# Patient Record
Sex: MALE | Race: ASIAN | NOT HISPANIC OR LATINO
[De-identification: names, ages, dates, MRNs, and addresses within clinical notes are randomized per-mention and may not be internally consistent; named-entity substitution may affect disease eponyms.]

---

## 2023-06-16 PROBLEM — Z00.00 ENCOUNTER FOR PREVENTIVE HEALTH EXAMINATION: Status: ACTIVE | Noted: 2023-06-16

## 2023-06-26 ENCOUNTER — APPOINTMENT (OUTPATIENT)
Dept: ORTHOPEDIC SURGERY | Facility: CLINIC | Age: 48
End: 2023-06-26
Payer: MEDICARE

## 2023-06-26 PROCEDURE — 99203 OFFICE O/P NEW LOW 30 MIN: CPT

## 2023-06-26 RX ORDER — SPIRONOLACTONE 25 MG/1
25 TABLET ORAL
Qty: 90 | Refills: 0 | Status: ACTIVE | COMMUNITY
Start: 2023-06-24

## 2023-06-26 RX ORDER — DAPAGLIFLOZIN 10 MG/1
10 TABLET, FILM COATED ORAL
Qty: 90 | Refills: 0 | Status: ACTIVE | COMMUNITY
Start: 2023-03-29

## 2023-06-26 RX ORDER — PANTOPRAZOLE 40 MG/1
40 TABLET, DELAYED RELEASE ORAL
Qty: 90 | Refills: 0 | Status: ACTIVE | COMMUNITY
Start: 2023-04-04

## 2023-06-26 RX ORDER — BUDESONIDE AND FORMOTEROL FUMARATE DIHYDRATE 160; 4.5 UG/1; UG/1
160-4.5 AEROSOL RESPIRATORY (INHALATION)
Qty: 31 | Refills: 0 | Status: ACTIVE | COMMUNITY
Start: 2023-05-31

## 2023-06-26 RX ORDER — SACUBITRIL AND VALSARTAN 97; 103 MG/1; MG/1
97-103 TABLET, FILM COATED ORAL
Qty: 60 | Refills: 0 | Status: ACTIVE | COMMUNITY
Start: 2023-06-23

## 2023-06-26 RX ORDER — ALBUTEROL SULFATE 90 UG/1
108 (90 BASE) INHALANT RESPIRATORY (INHALATION)
Qty: 8 | Refills: 0 | Status: ACTIVE | COMMUNITY
Start: 2023-05-31

## 2023-06-26 NOTE — IMAGING
[Right] : right shoulder [Outside films reviewed] : Outside films reviewed [There are no fractures, subluxations or dislocations. No significant abnormalities are seen] : There are no fractures, subluxations or dislocations. No significant abnormalities are seen [Type 2 acromion] : Type 2 acromion [AC Joint Arthrosis] : AC Joint Arthrosis [de-identified] : (Physical Exam: Shoulder)\par \par Laterality is right \par \par Patient is in no acute distress, alert and oriented\par Sensation is grossly intact to light touch in the hand\par Motor function is 5/5 in the hand\par Capillary refill is less than 2 seconds in the fingers\par Lymphadenopathy is not present\par Peripheral edema is not present\par \par Skin is intact \par Swelling is not present \par Atrophy is not present\par Scapular winging is not present\par Deformity of the AC joint is not present\par Deformity of the biceps is not present \par \par Bicipital groove tenderness is not present \par AC joint tenderness is not present \par Scapulothoracic tenderness is present \par Cervical paraspinal tenderness is not present \par \par Active forward elevation is 175\par Passive forward elevation is 175\par External rotation at the side is 80\par Internal rotation behind the back is to the level of T7 \par \par Forward elevation strength is 5/5\par External rotation strength at the side is 5/5 \par Internal rotation strength at the side is 5/5 \par Deltoid strength of anterior, posterior and lateral heads is 5/5\par \par Dixon test is abnormal \par O’Leroy’s test is normal\par Speed’s test is normal\par Cross body adduction test is normal\par Apprehension and relocation is normal\par Sulcus sign is normal\par Belly press test is normal\par Spurling’s test is normal\par

## 2023-06-26 NOTE — DISCUSSION/SUMMARY
[de-identified] : (Assessment and Plan)\par \par History, clinical examination and imaging were most consistent with:\par -right shoulder rotator cuff tendonitis\par \par The diagnosis was explained in detail. The potential non-surgical and surgical treatments were reviewed. The relative risks and benefits of each option were considered relative to the patient’s age, activity level, medical history, symptom severity and previously attempted treatments. \par \par The patient was advised to consult with their primary medical provider prior to initiation of any new medications to reduce the risk of adverse effects specific to their long-term home medications and medical history. The risk of gastrointestinal irritation and kidney injury specific to long-term NSAID use was discussed.   \par \par \par -Patient will proceed with formal PT.\par -Over the counter acetaminophen as needed for pain. \par -The added clinical utility of an MRI was discussed. The patient deferred further diagnostic testing at this time.\par -Follow up in 6 weeks. Consider MRI at that time.\par \par (MDM) \par \par Problem Complexity\par -Moderate: chronic illness with exacerbation\par \par Risk\par -Low: over the counter medication\par \par -Patient has not been seen by another provider in my practice within the past 2 years who specializes in orthopedic sports medicine, shoulder or elbow surgery. \par \par

## 2023-06-26 NOTE — HISTORY OF PRESENT ILLNESS
[de-identified] : (History of Present Illness)\par Patient age in years is 48 \par Occupation is not currently working\par \par Body part causing symptoms is the RT shoulder \par Symptoms began about a few weeks ago. NKI, woke up with pain. \par Location of pain is anterior lateral\par Quality of pain is  sharp\par Pain score at rest is  3/10\par Pain score during activity is  10/10\par Radicular symptoms are not present \par Prior treatments include none\par Patient's condition is not associated with worker’s compensation, no-fault or interscholastic athletics \par   \par IMPRESSION:\par Mild acromioclavicular degenerative changes M19.011\par \par Patient takes multiple medications due to CHF and tourette's\par \par \par

## 2023-08-28 ENCOUNTER — APPOINTMENT (OUTPATIENT)
Dept: ORTHOPEDIC SURGERY | Facility: CLINIC | Age: 48
End: 2023-08-28
Payer: MEDICARE

## 2023-08-28 PROCEDURE — 99213 OFFICE O/P EST LOW 20 MIN: CPT

## 2023-08-28 NOTE — IMAGING
[Right] : right shoulder [Outside films reviewed] : Outside films reviewed [There are no fractures, subluxations or dislocations. No significant abnormalities are seen] : There are no fractures, subluxations or dislocations. No significant abnormalities are seen [Type 2 acromion] : Type 2 acromion [AC Joint Arthrosis] : AC Joint Arthrosis [de-identified] : (Physical Exam: Shoulder)  Laterality is right   Patient is in no acute distress, alert and oriented Sensation is grossly intact to light touch in the hand Motor function is 5/5 in the hand Capillary refill is less than 2 seconds in the fingers Lymphadenopathy is not present Peripheral edema is not present  Skin is intact  Swelling is not present  Atrophy is not present Scapular winging is not present Deformity of the AC joint is not present Deformity of the biceps is not present   Bicipital groove tenderness is not present  AC joint tenderness is not present  Scapulothoracic tenderness is present  Cervical paraspinal tenderness is not present   Active forward elevation is 175 Passive forward elevation is 175 External rotation at the side is 80 Internal rotation behind the back is to the level of T7   Forward elevation strength is 5/5 External rotation strength at the side is 5/5  Internal rotation strength at the side is 5/5  Deltoid strength of anterior, posterior and lateral heads is 5/5  Dixon test is abnormal  Reeves's test is normal Speed's test is normal Cross body adduction test is normal Apprehension and relocation is normal Sulcus sign is normal Belly press test is normal Spurling's test is normal

## 2023-08-28 NOTE — DISCUSSION/SUMMARY
[de-identified] : (Assessment and Plan)  History, clinical examination and imaging were most consistent with: -right shoulder rotator cuff partial tear  The diagnosis was explained in detail. The potential non-surgical and surgical treatments were reviewed. The relative risks and benefits of each option were considered relative to the patient's age, activity level, medical history, symptom severity and previously attempted treatments.   The patient was advised to consult with their primary medical provider prior to initiation of any new medications to reduce the risk of adverse effects specific to their long-term home medications and medical history. The risk of gastrointestinal irritation and kidney injury specific to long-term NSAID use was discussed.      -Patient will continue with formal PT and transition to HEP.  -Over the counter acetaminophen as needed for pain.  -MRI is deferred as symptoms are improving -Follow up as needed, if symptoms recur would consider MRI and CSI.   (MDM)   Problem Complexity -Moderate: chronic illness with exacerbation

## 2023-08-28 NOTE — HISTORY OF PRESENT ILLNESS
[de-identified] : 08/28/2023 RT shoulder f/u. Shoulder feeling much better. Going to PT 2x a week.   Patient age in years is 48  Occupation is not currently working Body part causing symptoms is the RT shoulder  Symptoms began about a few weeks ago. NKI, woke up with pain.  Location of pain is anterior lateral Quality of pain is  sharp Pain score at rest is  3/10 Pain score during activity is  10/10 Radicular symptoms are not present  Prior treatments include none Patient's condition is not associated with worker's compensation, no-fault or interscholastic athletics  Patient takes multiple medications due to CHF and tourette's

## 2023-10-06 ENCOUNTER — APPOINTMENT (OUTPATIENT)
Dept: PULMONOLOGY | Facility: CLINIC | Age: 48
End: 2023-10-06
Payer: MEDICARE

## 2023-10-06 VITALS
WEIGHT: 217 LBS | OXYGEN SATURATION: 96 % | TEMPERATURE: 97.5 F | HEIGHT: 63 IN | BODY MASS INDEX: 38.45 KG/M2 | HEART RATE: 70 BPM | DIASTOLIC BLOOD PRESSURE: 62 MMHG | SYSTOLIC BLOOD PRESSURE: 100 MMHG

## 2023-10-06 DIAGNOSIS — Z87.898 PERSONAL HISTORY OF OTHER SPECIFIED CONDITIONS: ICD-10-CM

## 2023-10-06 DIAGNOSIS — Z86.59 PERSONAL HISTORY OF OTHER MENTAL AND BEHAVIORAL DISORDERS: ICD-10-CM

## 2023-10-06 DIAGNOSIS — Z87.09 PERSONAL HISTORY OF OTHER DISEASES OF THE RESPIRATORY SYSTEM: ICD-10-CM

## 2023-10-06 DIAGNOSIS — K21.00 GASTRO-ESOPHAGEAL REFLUX DISEASE WITH ESOPHAGITIS, WITHOUT BLEEDING: ICD-10-CM

## 2023-10-06 DIAGNOSIS — Z86.79 PERSONAL HISTORY OF OTHER DISEASES OF THE CIRCULATORY SYSTEM: ICD-10-CM

## 2023-10-06 PROCEDURE — 99204 OFFICE O/P NEW MOD 45 MIN: CPT | Mod: 25

## 2023-10-06 PROCEDURE — 94727 GAS DIL/WSHOT DETER LNG VOL: CPT

## 2023-10-06 PROCEDURE — 94729 DIFFUSING CAPACITY: CPT

## 2023-10-06 PROCEDURE — 94010 BREATHING CAPACITY TEST: CPT

## 2023-10-06 RX ORDER — SACUBITRIL AND VALSARTAN 49; 51 MG/1; MG/1
49-51 TABLET, FILM COATED ORAL
Qty: 60 | Refills: 0 | Status: DISCONTINUED | COMMUNITY
Start: 2022-11-23 | End: 2023-10-06

## 2023-10-06 RX ORDER — CLONAZEPAM 0.5 MG/1
0.5 TABLET ORAL
Qty: 60 | Refills: 0 | Status: DISCONTINUED | COMMUNITY
Start: 2023-05-22 | End: 2023-10-06

## 2023-10-06 RX ORDER — TRIAMCINOLONE ACETONIDE 1 MG/G
0.1 OINTMENT TOPICAL
Qty: 454 | Refills: 0 | Status: DISCONTINUED | COMMUNITY
Start: 2023-03-28 | End: 2023-10-06

## 2023-10-06 RX ORDER — CARVEDILOL 6.25 MG/1
6.25 TABLET, FILM COATED ORAL
Qty: 60 | Refills: 0 | Status: DISCONTINUED | COMMUNITY
Start: 2023-02-13 | End: 2023-10-06

## 2023-10-06 RX ORDER — CYCLOBENZAPRINE HYDROCHLORIDE 10 MG/1
10 TABLET, FILM COATED ORAL
Qty: 21 | Refills: 0 | Status: DISCONTINUED | COMMUNITY
Start: 2023-06-13 | End: 2023-10-06

## 2023-10-06 RX ORDER — COLCHICINE 0.6 MG/1
0.6 TABLET ORAL
Qty: 6 | Refills: 0 | Status: DISCONTINUED | COMMUNITY
Start: 2023-03-01 | End: 2023-10-06

## 2023-10-06 RX ORDER — FUROSEMIDE 20 MG/1
20 TABLET ORAL
Qty: 30 | Refills: 0 | Status: DISCONTINUED | COMMUNITY
Start: 2023-03-13 | End: 2023-10-06

## 2023-10-06 RX ORDER — ROPINIROLE 0.5 MG/1
0.5 TABLET, FILM COATED ORAL
Qty: 90 | Refills: 0 | Status: DISCONTINUED | COMMUNITY
Start: 2023-06-16 | End: 2023-10-06

## 2023-10-06 RX ORDER — HYDROCORTISONE 1 %
12 CREAM (GRAM) TOPICAL
Qty: 400 | Refills: 0 | Status: DISCONTINUED | COMMUNITY
Start: 2023-02-09 | End: 2023-10-06

## 2023-10-12 ENCOUNTER — NON-APPOINTMENT (OUTPATIENT)
Age: 48
End: 2023-10-12

## 2023-10-31 ENCOUNTER — APPOINTMENT (OUTPATIENT)
Dept: NEUROLOGY | Facility: CLINIC | Age: 48
End: 2023-10-31
Payer: MEDICARE

## 2023-10-31 VITALS — WEIGHT: 210 LBS | HEIGHT: 63 IN | BODY MASS INDEX: 37.21 KG/M2

## 2023-10-31 VITALS — SYSTOLIC BLOOD PRESSURE: 102 MMHG | OXYGEN SATURATION: 97 % | HEART RATE: 65 BPM | DIASTOLIC BLOOD PRESSURE: 60 MMHG

## 2023-10-31 DIAGNOSIS — Z02.82 ENCOUNTER FOR ADOPTION SERVICES: ICD-10-CM

## 2023-10-31 DIAGNOSIS — Z86.69 PERSONAL HISTORY OF OTHER DISEASES OF THE NERVOUS SYSTEM AND SENSE ORGANS: ICD-10-CM

## 2023-10-31 DIAGNOSIS — Z86.79 PERSONAL HISTORY OF OTHER DISEASES OF THE CIRCULATORY SYSTEM: ICD-10-CM

## 2023-10-31 DIAGNOSIS — Z78.9 OTHER SPECIFIED HEALTH STATUS: ICD-10-CM

## 2023-10-31 DIAGNOSIS — Z87.898 PERSONAL HISTORY OF OTHER SPECIFIED CONDITIONS: ICD-10-CM

## 2023-10-31 DIAGNOSIS — K25.4 CHRONIC OR UNSPECIFIED GASTRIC ULCER WITH HEMORRHAGE: ICD-10-CM

## 2023-10-31 DIAGNOSIS — Z87.09 PERSONAL HISTORY OF OTHER DISEASES OF THE RESPIRATORY SYSTEM: ICD-10-CM

## 2023-10-31 DIAGNOSIS — I82.401 ACUTE EMBOLISM AND THROMBOSIS OF UNSPECIFIED DEEP VEINS OF RIGHT LOWER EXTREMITY: ICD-10-CM

## 2023-10-31 PROCEDURE — 99203 OFFICE O/P NEW LOW 30 MIN: CPT

## 2023-11-01 PROBLEM — Z87.09 HISTORY OF ASTHMA: Status: RESOLVED | Noted: 2023-11-01 | Resolved: 2023-11-01

## 2023-11-01 PROBLEM — Z86.69 HISTORY OF OBSTRUCTIVE SLEEP APNEA: Status: RESOLVED | Noted: 2023-11-01 | Resolved: 2023-11-01

## 2023-11-01 PROBLEM — Z87.898 HISTORY OF INSOMNIA: Status: RESOLVED | Noted: 2023-11-01 | Resolved: 2023-11-01

## 2023-11-17 ENCOUNTER — APPOINTMENT (OUTPATIENT)
Dept: PULMONOLOGY | Facility: CLINIC | Age: 48
End: 2023-11-17
Payer: MEDICARE

## 2023-11-17 VITALS
HEIGHT: 63 IN | WEIGHT: 210 LBS | OXYGEN SATURATION: 95 % | DIASTOLIC BLOOD PRESSURE: 70 MMHG | SYSTOLIC BLOOD PRESSURE: 100 MMHG | TEMPERATURE: 97.3 F | BODY MASS INDEX: 37.21 KG/M2 | HEART RATE: 86 BPM

## 2023-11-17 PROCEDURE — 99214 OFFICE O/P EST MOD 30 MIN: CPT

## 2023-11-17 RX ORDER — BUDESONIDE AND FORMOTEROL FUMARATE DIHYDRATE 80; 4.5 UG/1; UG/1
80-4.5 AEROSOL RESPIRATORY (INHALATION) TWICE DAILY
Qty: 3 | Refills: 6 | Status: ACTIVE | COMMUNITY
Start: 2023-11-17 | End: 1900-01-01

## 2023-11-28 ENCOUNTER — APPOINTMENT (OUTPATIENT)
Dept: NEUROLOGY | Facility: CLINIC | Age: 48
End: 2023-11-28
Payer: MEDICARE

## 2023-11-28 VITALS
HEART RATE: 82 BPM | HEIGHT: 63 IN | SYSTOLIC BLOOD PRESSURE: 100 MMHG | DIASTOLIC BLOOD PRESSURE: 60 MMHG | BODY MASS INDEX: 37.21 KG/M2 | OXYGEN SATURATION: 98 % | WEIGHT: 210 LBS

## 2023-11-28 PROCEDURE — 99215 OFFICE O/P EST HI 40 MIN: CPT

## 2023-11-28 RX ORDER — FLUPHENAZINE HYDROCHLORIDE 1 MG/1
1 TABLET, FILM COATED ORAL
Qty: 60 | Refills: 0 | Status: ACTIVE | COMMUNITY
Start: 2023-11-28 | End: 1900-01-01

## 2023-12-10 ENCOUNTER — NON-APPOINTMENT (OUTPATIENT)
Age: 48
End: 2023-12-10

## 2023-12-21 ENCOUNTER — APPOINTMENT (OUTPATIENT)
Dept: NEUROLOGY | Facility: CLINIC | Age: 48
End: 2023-12-21
Payer: MEDICARE

## 2023-12-21 VITALS
SYSTOLIC BLOOD PRESSURE: 102 MMHG | HEIGHT: 63 IN | WEIGHT: 210 LBS | BODY MASS INDEX: 37.21 KG/M2 | DIASTOLIC BLOOD PRESSURE: 70 MMHG | OXYGEN SATURATION: 95 % | HEART RATE: 73 BPM

## 2023-12-21 DIAGNOSIS — F32.A ANXIETY DISORDER, UNSPECIFIED: ICD-10-CM

## 2023-12-21 DIAGNOSIS — F41.9 ANXIETY DISORDER, UNSPECIFIED: ICD-10-CM

## 2023-12-21 PROCEDURE — 99215 OFFICE O/P EST HI 40 MIN: CPT

## 2023-12-21 RX ORDER — ESCITALOPRAM OXALATE 10 MG/1
10 TABLET ORAL DAILY
Qty: 60 | Refills: 2 | Status: ACTIVE | COMMUNITY
Start: 2023-11-28 | End: 1900-01-01

## 2023-12-21 RX ORDER — ROPINIROLE 3 MG/1
TABLET, FILM COATED ORAL
Refills: 0 | Status: COMPLETED | COMMUNITY
End: 2023-12-21

## 2023-12-21 RX ORDER — METOPROLOL SUCCINATE 100 MG/1
100 TABLET, EXTENDED RELEASE ORAL
Refills: 0 | Status: ACTIVE | COMMUNITY

## 2023-12-21 RX ORDER — TRIHEXYPHENIDYL HYDROCHLORIDE 2 MG/1
2 TABLET ORAL
Qty: 60 | Refills: 3 | Status: ACTIVE | COMMUNITY
Start: 2023-11-15 | End: 1900-01-01

## 2023-12-21 RX ORDER — CARVEDILOL 12.5 MG/1
12.5 TABLET, FILM COATED ORAL
Qty: 180 | Refills: 0 | Status: DISCONTINUED | COMMUNITY
Start: 2023-03-29 | End: 2023-12-21

## 2023-12-21 NOTE — HISTORY OF PRESENT ILLNESS
[FreeTextEntry1] : Since last visit with the office, patient has been experiencing increase in head shaking. He reports he ran out of the Trihexyphenidyl about 3 weeks ago, and has not refilled it. At last visit was started on Lexapro and fluphenazine, which he has been slowly increasing. He has also been tapering down the sertraline as ordered. At this time he is on Sertraline 75mg daily, Lexapro increased to 15mg daily, and Fluphenazine 1.5mg daily.  He also reports his asthma/wheezing got worse, he saw his cardiologist who changed carvedilol to metoprolol. He volunteers in the [employer], not working at this time. Was being bullied at work previously. Feels he is having less involuntary movement overall, but reports legs are still bad. Does not feel ropinirole is helping at all.  Has appt 1/17 with psychiatry. No other new neurological complaints at this time.

## 2023-12-21 NOTE — PHYSICAL EXAM
[FreeTextEntry1] : GENERAL PHYSICAL EXAM: GEN: no distress, normal affect EYES: sclera white, conjunctiva clear, no nystagmus PULM: no respiratory distress EXT: no edema, no cyanosis MSK: muscle tone and strength normal SKIN: warm, dry, no rash or lesion on exposed skin   NEUROLOGICAL EXAM: Mental Status Orientation: alert and oriented to person, place, time, and situation Language: clear and fluent, intact comprehension and repetition   Cranial Nerves II: visual fields full to confrontation III, IV, VI: PERRL, EOMI V, VII: facial sensation and movement intact and symmetric VIII: hearing intact IX, X: uvula midline, soft palate elevates normally XI: BL shoulder shrug intact XII: tongue midline   Motor Bilateral muscle strength 5/5 in UE and LE, proximally and distally Tone and bulk are normal in upper and lower limbs Involuntary movement is seen in all 4 extremities and head.   Sensation Intact to light touch in all 4 EXTs  Coordination Normal FTN bilaterally   Gait Normal stance, stride, and pivot turn Tandem walk intact.

## 2023-12-21 NOTE — REVIEW OF SYSTEMS
[Negative] : Heme/Lymph [FreeTextEntry2] :  10 systems reviewed and negative unless otherwise noted in HPI

## 2023-12-21 NOTE — ASSESSMENT
[FreeTextEntry1] : Since last visit with the office, patient has been experiencing increase in head shaking. He reports he ran out of the Trihexyphenidyl about 3 weeks ago, and has not refilled it. At last visit was started on lexapro and fluphenazine, which he has been slowly increasing. He has also been tapering down the sertraline as ordered. At this time he is on Sertraline 75mg daily, Lexapro increased to 15mg daily, and Fluphenazine 1.5mg daily. He also reports his asthma/wheezing got worse, he saw his cardiologist who changed carvedilol to metoprolol. 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 Was being bullied at work previously. Feels he is having less involuntary movement overall, but reports legs are still bad. Does not feel ropinirole is helping at all. Has appt 1/17 with psychiatry. No other new neurological complaints at this time.  Plan: - Stop ropinirole as the patient does not see any improvement in his RLS. - Increase Lexapro to 20mg daily. - Renewed Clonazepam 0.5mg PRN at HS for sleep disturbances - Renewed Trihexyphenidyl 2mg BID for head shaking related to Tourette syndrome. - Follow-up with Dr Muñoz in 1 month. - Maintain Psychiatry appointment in January for further management of Tourette syndrome.

## 2023-12-28 ENCOUNTER — APPOINTMENT (OUTPATIENT)
Dept: PULMONOLOGY | Facility: CLINIC | Age: 48
End: 2023-12-28
Payer: MEDICARE

## 2023-12-28 VITALS
BODY MASS INDEX: 38.45 KG/M2 | HEART RATE: 78 BPM | DIASTOLIC BLOOD PRESSURE: 72 MMHG | WEIGHT: 217 LBS | TEMPERATURE: 98.3 F | OXYGEN SATURATION: 95 % | HEIGHT: 63 IN | SYSTOLIC BLOOD PRESSURE: 108 MMHG

## 2023-12-28 PROCEDURE — 99215 OFFICE O/P EST HI 40 MIN: CPT

## 2023-12-28 RX ORDER — GUANFACINE 1 MG/1
1 TABLET ORAL
Refills: 0 | Status: DISCONTINUED | COMMUNITY
End: 2023-12-28

## 2023-12-28 NOTE — HISTORY OF PRESENT ILLNESS
[Never] : never [Obstructive Sleep Apnea] : obstructive sleep apnea [CPAP:] : CPAP [TextBox_4] : 48 male hx asthma and  obstructive sleep apnea    on carvedilol and notes inc wheeze for 1 month but on carvedilol for 5 yrs no cough +sneeze no fever no chest pain  ++dog  and thinks possible allergy +rhinorrhea  [TextBox_158] : [facility] ([facility])

## 2023-12-28 NOTE — PROCEDURE
[FreeTextEntry1] : Compliance report 9/29/2023 12/27/2023 68% of days used only 6% greater than 4 hours Maximum leak 103 with an AHI 56.  This is poor compliance.

## 2023-12-28 NOTE — DISCUSSION/SUMMARY
[FreeTextEntry1] : Mr. Santos has asthma and has noted some wheezing.  Although he has a dog he has been exposed for over a year and although he is on carvedilol this is been for 5 years.  It is unclear why now he is having difficulty.  I do not hear wheezing at this time.  Will start Spiriva 1.25 mg 2 sprays daily in addition to the Symbicort.  The patient has obstructive apnea.  He is having trouble with the mask as it constantly falls off and he has difficulty putting on because of his Tourette's syndrome.  We referred him to follow-up at [facility] in [place] for proper fitting of the mask. The patient understands and agrees with the plan of care. Today's office visit encompassed 42 minutes. I conducted an extensive history, physical exam and reviewed diagnosis and treatment options including diagnostic tests radiology studies including cat scans and the use of prescription medication.

## 2023-12-28 NOTE — REASON FOR VISIT
[Acute] : an acute visit [Asthma] : asthma [Sleep Apnea] : sleep apnea [Cough] : cough [Shortness of Breath] : shortness of breath [Wheezing] : wheezing [TextBox_44] : Pt states that he started taking carvedilol which made his wheezing louder, pt d/c carvedilol and started taking metoprolol instead. Pt states that his wheezing is slightly better but still heavy in sound.

## 2024-01-09 ENCOUNTER — NON-APPOINTMENT (OUTPATIENT)
Age: 49
End: 2024-01-09

## 2024-01-18 ENCOUNTER — APPOINTMENT (OUTPATIENT)
Dept: PULMONOLOGY | Facility: CLINIC | Age: 49
End: 2024-01-18
Payer: MEDICARE

## 2024-01-18 VITALS
SYSTOLIC BLOOD PRESSURE: 110 MMHG | OXYGEN SATURATION: 95 % | WEIGHT: 224.8 LBS | TEMPERATURE: 97.1 F | HEART RATE: 81 BPM | BODY MASS INDEX: 39.83 KG/M2 | HEIGHT: 63 IN | DIASTOLIC BLOOD PRESSURE: 60 MMHG

## 2024-01-18 PROCEDURE — 99214 OFFICE O/P EST MOD 30 MIN: CPT

## 2024-01-18 RX ORDER — ESCITALOPRAM OXALATE 10 MG/1
10 TABLET, FILM COATED ORAL
Refills: 0 | Status: ACTIVE | COMMUNITY

## 2024-01-18 RX ORDER — SERTRALINE HYDROCHLORIDE 100 MG/1
100 TABLET, FILM COATED ORAL
Qty: 130 | Refills: 0 | Status: DISCONTINUED | COMMUNITY
Start: 2023-05-05 | End: 2024-01-18

## 2024-01-18 RX ORDER — TIOTROPIUM BROMIDE INHALATION SPRAY 1.56 UG/1
1.25 SPRAY, METERED RESPIRATORY (INHALATION) DAILY
Qty: 1 | Refills: 6 | Status: ACTIVE | COMMUNITY
Start: 2024-01-18 | End: 1900-01-01

## 2024-01-18 NOTE — DISCUSSION/SUMMARY
[FreeTextEntry1] : Mr. Santos has obstructive sleep apnea.  I did discuss with him that he can use the new CPAP machine without the Symbicort it just will keep track of the information but he will know if he is symptoms have resolved.  He thinks the Spiriva has helped the wheezing somewhat so we will continue Spiriva 1.25 mg 2 sprays every day. The patient understands and agrees with plan of care. Today's office visit encompassed 32 minutes. I conducted an extensive history, physical exam and reviewed diagnosis and treatment options including diagnostic tests,radiology studies including cat scans and the use of prescription medication.

## 2024-01-18 NOTE — REASON FOR VISIT
[Follow-Up] : a follow-up visit [Asthma] : asthma [Sleep Apnea] : sleep apnea [Shortness of Breath] : shortness of breath [Wheezing] : wheezing [TextBox_44] : 3 weeks. Pt states that he had a virus last week, Pt went to Urgent care in [place] and was prescribed benzonatate which relieved his symptoms. Pt states that his Cpap machine arrived 2-3 weeks but didint start using it because he doesnt have a SIM card.

## 2024-01-18 NOTE — HISTORY OF PRESENT ILLNESS
[Never] : never [CPAP:] : CPAP [Full Face mask] : full face mask [TextBox_4] : 49 male hx  obstructive sleep apnea not using apap bec doesnt have sim card for new machine and mask breathing still sl wheeze some improvement with spiriva  activity level without change [TextBox_158] : resmed

## 2024-01-18 NOTE — PHYSICAL EXAM
[No Acute Distress] : no acute distress [Normal Oropharynx] : normal oropharynx [Normal Appearance] : normal appearance [No Neck Mass] : no neck mass [Normal Rate/Rhythm] : normal rate/rhythm [Normal S1, S2] : normal s1, s2 [No Murmurs] : no murmurs [No Resp Distress] : no resp distress [Clear to Auscultation Bilaterally] : clear to auscultation bilaterally [No Abnormalities] : no abnormalities [Benign] : benign [Normal Gait] : normal gait [No Clubbing] : no clubbing [No Cyanosis] : no cyanosis [No Edema] : no edema [FROM] : FROM [Normal Color/ Pigmentation] : normal color/ pigmentation [No Focal Deficits] : no focal deficits [Oriented x3] : oriented x3 [Normal Affect] : normal affect [TextBox_2] : Tourette's syndrome presentation

## 2024-02-01 ENCOUNTER — NON-APPOINTMENT (OUTPATIENT)
Age: 49
End: 2024-02-01

## 2024-02-06 ENCOUNTER — APPOINTMENT (OUTPATIENT)
Dept: NEUROLOGY | Facility: CLINIC | Age: 49
End: 2024-02-06
Payer: MEDICARE

## 2024-02-06 VITALS
WEIGHT: 224 LBS | BODY MASS INDEX: 39.69 KG/M2 | DIASTOLIC BLOOD PRESSURE: 63 MMHG | OXYGEN SATURATION: 95 % | HEIGHT: 63 IN | SYSTOLIC BLOOD PRESSURE: 98 MMHG | HEART RATE: 71 BPM

## 2024-02-06 DIAGNOSIS — F42.9 OBSESSIVE-COMPULSIVE DISORDER, UNSPECIFIED: ICD-10-CM

## 2024-02-06 DIAGNOSIS — F95.2 TOURETTE'S DISORDER: ICD-10-CM

## 2024-02-06 PROCEDURE — 99203 OFFICE O/P NEW LOW 30 MIN: CPT

## 2024-02-06 NOTE — HISTORY OF PRESENT ILLNESS
[FreeTextEntry1] : 49-year-old man with history of Tourette syndrome and OCD on Lexapro, sertraline 75, fluphenazine, Trihexyphenidyl here for follow-up. Last seen Dec 21, 2023, at that time recommended to stop repeat ropinirole, increase Lexapro to 20 mg daily and continue other medications.    On lexapro 20mg, sertraline was stopped, fluphenazine 1 tablet, clonazepam 0.5mg at bedtime, also on the trihexiphenidyl 2mg BID.   Still his some of his tics, like moving legs, rubbing his head, touching himself, he doesn't have any vocal tics.   He is currently on social security. He was working in [place] but hasn't been working here because he has had to go to several doctors visits.

## 2024-02-06 NOTE — DISCUSSION/SUMMARY
[FreeTextEntry1] :  48 y/o man with hx of OCD, Tourette's, anxiety and depression. Anxiety has been better controlled on Lexapro. He is seeing psychiatry. Still has constant tics but feels that it is better.   Treatment:  - Continue Lexapro to 20mg daily. - Clonazepam 0.5mg PRN at HS for sleep disturbances - Trihexyphenidyl 2mg BID for head shaking related to Tourette syndrome. F/U in 4 months     Discussed common side effects of prescribed medications and potential alternatives.   Counseled patient on the importance of maintaining a healthy lifestyle, including balanced diet, adequate hydration, stress management, proper sleep hygiene, and physical activity.  Answered all questions and concerns to the best of my ability. Advised to call for any new or worsening symptoms.      In order to maintain continuity of care/prescription refills, patients must be seen on a yearly basis.   Total time spent on the day of the visit, including pre-visit and post-visit time was 20 minutes.

## 2024-02-06 NOTE — PHYSICAL EXAM
[General Appearance - Alert] : alert [General Appearance - In No Acute Distress] : in no acute distress [Oriented To Time, Place, And Person] : oriented to person, place, and time [Impaired Insight] : insight and judgment were intact [Affect] : the affect was normal [Person] : oriented to person [Place] : oriented to place [Time] : oriented to time [Concentration Intact] : normal concentrating ability [Visual Intact] : visual attention was ~T not ~L decreased [Naming Objects] : no difficulty naming common objects [Repeating Phrases] : no difficulty repeating a phrase [Writing A Sentence] : no difficulty writing a sentence [Fluency] : fluency intact [Comprehension] : comprehension intact [Reading] : reading intact [Past History] : adequate knowledge of personal past history [Cranial Nerves Optic (II)] : visual acuity intact bilaterally,  visual fields full to confrontation, pupils equal round and reactive to light [Cranial Nerves Oculomotor (III)] : extraocular motion intact [Cranial Nerves Trigeminal (V)] : facial sensation intact symmetrically [Cranial Nerves Facial (VII)] : face symmetrical [Cranial Nerves Vestibulocochlear (VIII)] : hearing was intact bilaterally [Cranial Nerves Glossopharyngeal (IX)] : tongue and palate midline [Cranial Nerves Accessory (XI - Cranial And Spinal)] : head turning and shoulder shrug symmetric [Cranial Nerves Hypoglossal (XII)] : there was no tongue deviation with protrusion [Motor Tone] : muscle tone was normal in all four extremities [Motor Strength] : muscle strength was normal in all four extremities [No Muscle Atrophy] : normal bulk in all four extremities [Sensation Tactile Decrease] : light touch was intact [Abnormal Walk] : normal gait [Balance] : balance was intact [Past-pointing] : there was no past-pointing [Tremor] : no tremor present [3+] : Patella left 3+ [2+] : Ankle jerk left 2+ [Plantar Reflex Right Only] : normal on the right [Plantar Reflex Left Only] : normal on the left [FreeTextEntry4] : speech dysprosodic, slurred, slowed, Tics: rubbing head, laughing/grunts, touching his legs, hitting his left thigh.  [Sclera] : the sclera and conjunctiva were normal [PERRL With Normal Accommodation] : pupils were equal in size, round, reactive to light, with normal accommodation [Extraocular Movements] : extraocular movements were intact [Full Visual Field] : full visual field [Outer Ear] : the ears and nose were normal in appearance [Hearing Threshold Finger Rub Not Ashley] : hearing was normal [Neck Appearance] : the appearance of the neck was normal [] : no respiratory distress [Respiration, Rhythm And Depth] : normal respiratory rhythm and effort

## 2024-02-08 ENCOUNTER — NON-APPOINTMENT (OUTPATIENT)
Age: 49
End: 2024-02-08

## 2024-02-29 RX ORDER — CLONAZEPAM 0.5 MG/1
0.5 TABLET ORAL
Qty: 30 | Refills: 1 | Status: ACTIVE | COMMUNITY
Start: 2023-11-01 | End: 1900-01-01

## 2024-03-18 ENCOUNTER — APPOINTMENT (OUTPATIENT)
Dept: PULMONOLOGY | Facility: CLINIC | Age: 49
End: 2024-03-18
Payer: MEDICARE

## 2024-03-18 VITALS
WEIGHT: 222 LBS | SYSTOLIC BLOOD PRESSURE: 100 MMHG | TEMPERATURE: 97.1 F | HEART RATE: 87 BPM | DIASTOLIC BLOOD PRESSURE: 60 MMHG | HEIGHT: 63 IN | BODY MASS INDEX: 39.34 KG/M2 | OXYGEN SATURATION: 95 %

## 2024-03-18 DIAGNOSIS — J45.40 MODERATE PERSISTENT ASTHMA, UNCOMPLICATED: ICD-10-CM

## 2024-03-18 DIAGNOSIS — G47.33 OBSTRUCTIVE SLEEP APNEA (ADULT) (PEDIATRIC): ICD-10-CM

## 2024-03-18 PROCEDURE — 99214 OFFICE O/P EST MOD 30 MIN: CPT

## 2024-03-18 RX ORDER — PRAVASTATIN SODIUM 20 MG/1
20 TABLET ORAL
Refills: 0 | Status: ACTIVE | COMMUNITY

## 2024-03-18 NOTE — REASON FOR VISIT
[Follow-Up] : a follow-up visit [Asthma] : asthma [Sleep Apnea] : sleep apnea [Wheezing] : wheezing [Shortness of Breath] : shortness of breath [TextEntry] : Patient states he is not using his CPAP due to a letter he received stating there is a recall.  Patient spoke to company and they sent him a new CPAP but they sent it to his previous address in [place] he is going there next week and will pick it up.

## 2024-03-18 NOTE — DISCUSSION/SUMMARY
[FreeTextEntry1] : Mr. Santos has mild asthma.  He is using the Symbicort twice a day but was wheezing so just started the Spiriva.  There is no wheezing on today's exam.  The patient has obstructive sleep apnea and had a new machine mailed to his [place] address.  He is moving back to [place] in 3 days.  He will follow-up with his pulmonologist in that state.  The patient will continue all therapy at this time. The patient understands and agrees with plan of care. Today's office visit encompassed 32 minutes. I conducted an extensive history, physical exam and reviewed diagnosis and treatment options including diagnostic tests,radiology studies including cat scans and the use of prescription medication.

## 2024-03-18 NOTE — HISTORY OF PRESENT ILLNESS
[Never] : never [Obstructive Sleep Apnea] : obstructive sleep apnea [CPAP:] : CPAP [Full Face mask] : full face mask [TextBox_4] : 49  hx  obstructive sleep apnea and pt not using machine bec of recall getting new machine  in [place]  moving there Mar 21 breathing feels good occ wheeze   just started on spiriva  and remains on symbicort full activity  but limited  [TextBox_158] : [facility]

## 2024-03-18 NOTE — PHYSICAL EXAM
[Normal Oropharynx] : normal oropharynx [No Acute Distress] : no acute distress [Normal Appearance] : normal appearance [No Neck Mass] : no neck mass [Normal Rate/Rhythm] : normal rate/rhythm [Normal S1, S2] : normal s1, s2 [No Murmurs] : no murmurs [No Resp Distress] : no resp distress [Clear to Auscultation Bilaterally] : clear to auscultation bilaterally [No Abnormalities] : no abnormalities [Benign] : benign [No Clubbing] : no clubbing [No Cyanosis] : no cyanosis [No Edema] : no edema [FROM] : FROM [Normal Color/ Pigmentation] : normal color/ pigmentation [Oriented x3] : oriented x3 [Normal Affect] : normal affect [TextBox_132] : tourette synd

## 2024-04-18 ENCOUNTER — APPOINTMENT (OUTPATIENT)
Dept: PULMONOLOGY | Facility: CLINIC | Age: 49
End: 2024-04-18

## 2024-06-05 NOTE — HISTORY OF PRESENT ILLNESS
[FreeTextEntry1] : Since last visit:    Initial visit Feb 6: 49-year-old man with history of Tourette syndrome and OCD on Lexapro, sertraline 75, fluphenazine, Trihexyphenidyl here for follow-up. Last seen Dec 21, 2023, at that time recommended to stop repeat ropinirole, increase Lexapro to 20 mg daily and continue other medications.    On lexapro 20mg, sertraline was stopped, fluphenazine 1 tablet, clonazepam 0.5mg at bedtime, also on the trihexiphenidyl 2mg BID.   Still his some of his tics, like moving legs, rubbing his head, touching himself, he doesn't have any vocal tics.   He is currently on social security. He was working in [place] but hasn't been working here because he has had to go to several doctors visits.

## 2024-06-05 NOTE — PHYSICAL EXAM
[General Appearance - Alert] : alert [General Appearance - In No Acute Distress] : in no acute distress [Oriented To Time, Place, And Person] : oriented to person, place, and time [Impaired Insight] : insight and judgment were intact [Affect] : the affect was normal [Person] : oriented to person [Place] : oriented to place [Time] : oriented to time [Concentration Intact] : normal concentrating ability [Visual Intact] : visual attention was ~T not ~L decreased [Naming Objects] : no difficulty naming common objects [Repeating Phrases] : no difficulty repeating a phrase [Writing A Sentence] : no difficulty writing a sentence [Fluency] : fluency intact [Comprehension] : comprehension intact [Reading] : reading intact [Past History] : adequate knowledge of personal past history [Cranial Nerves Optic (II)] : visual acuity intact bilaterally,  visual fields full to confrontation, pupils equal round and reactive to light [Cranial Nerves Oculomotor (III)] : extraocular motion intact [Cranial Nerves Trigeminal (V)] : facial sensation intact symmetrically [Cranial Nerves Facial (VII)] : face symmetrical [Cranial Nerves Vestibulocochlear (VIII)] : hearing was intact bilaterally [Cranial Nerves Glossopharyngeal (IX)] : tongue and palate midline [Cranial Nerves Accessory (XI - Cranial And Spinal)] : head turning and shoulder shrug symmetric [Cranial Nerves Hypoglossal (XII)] : there was no tongue deviation with protrusion [Motor Tone] : muscle tone was normal in all four extremities [Motor Strength] : muscle strength was normal in all four extremities [No Muscle Atrophy] : normal bulk in all four extremities [Sensation Tactile Decrease] : light touch was intact [Abnormal Walk] : normal gait [Balance] : balance was intact [Past-pointing] : there was no past-pointing [Tremor] : no tremor present [3+] : Patella left 3+ [2+] : Ankle jerk left 2+ [Plantar Reflex Right Only] : normal on the right [Plantar Reflex Left Only] : normal on the left [[email or web address]] : speech dysprosodic, slurred, slowed, Tics: rubbing head, laughing/grunts, touching his legs, hitting his left thigh.  [Sclera] : the sclera and conjunctiva were normal [PERRL With Normal Accommodation] : pupils were equal in size, round, reactive to light, with normal accommodation [Extraocular Movements] : extraocular movements were intact [Full Visual Field] : full visual field [Outer Ear] : the ears and nose were normal in appearance [Hearing Threshold Finger Rub Not Foster] : hearing was normal [Neck Appearance] : the appearance of the neck was normal [] : no respiratory distress [Respiration, Rhythm And Depth] : normal respiratory rhythm and effort

## 2024-06-06 ENCOUNTER — APPOINTMENT (OUTPATIENT)
Dept: NEUROLOGY | Facility: CLINIC | Age: 49
End: 2024-06-06